# Patient Record
Sex: MALE | Race: WHITE | Employment: PART TIME | URBAN - METROPOLITAN AREA
[De-identification: names, ages, dates, MRNs, and addresses within clinical notes are randomized per-mention and may not be internally consistent; named-entity substitution may affect disease eponyms.]

---

## 2018-07-31 ENCOUNTER — OFFICE VISIT (OUTPATIENT)
Dept: INTERNAL MEDICINE CLINIC | Age: 23
End: 2018-07-31
Payer: COMMERCIAL

## 2018-07-31 VITALS
OXYGEN SATURATION: 98 % | WEIGHT: 126 LBS | HEART RATE: 75 BPM | DIASTOLIC BLOOD PRESSURE: 70 MMHG | HEIGHT: 70 IN | SYSTOLIC BLOOD PRESSURE: 112 MMHG | TEMPERATURE: 98.6 F | BODY MASS INDEX: 18.04 KG/M2

## 2018-07-31 DIAGNOSIS — F41.0 PANIC DISORDER: Primary | ICD-10-CM

## 2018-07-31 DIAGNOSIS — F19.20 DRUG ADDICTION (HCC): ICD-10-CM

## 2018-07-31 PROCEDURE — 99204 OFFICE O/P NEW MOD 45 MIN: CPT | Performed by: NURSE PRACTITIONER

## 2018-07-31 RX ORDER — GABAPENTIN 100 MG/1
CAPSULE ORAL
Refills: 1 | COMMUNITY
Start: 2018-07-13

## 2018-07-31 ASSESSMENT — PATIENT HEALTH QUESTIONNAIRE - PHQ9
SUM OF ALL RESPONSES TO PHQ9 QUESTIONS 1 & 2: 0
2. FEELING DOWN, DEPRESSED OR HOPELESS: 0
SUM OF ALL RESPONSES TO PHQ QUESTIONS 1-9: 0
1. LITTLE INTEREST OR PLEASURE IN DOING THINGS: 0

## 2018-07-31 NOTE — PROGRESS NOTES
[address] [place] [address]    Roby García is a 25 y.o. male who is here with c/o of   Chief Complaint   Patient presents with    Established New Doctor     patient would like to discuss plan of treatment/medication       Patient Accompanied by: patient    RADHA García is here to establish care and to discuss     · He reports that he is weaning off of suboxone for oxycodone dependence. He stopped taking oxycodone approximately 3 years ago. He is currently going to [facility]'s clinic. He reports that he is also seeing a psychiatrist. He reports that he has had good results with clonopin, but due to his suboxone use, he is not able to take this medication. He feels extremely frustrated and states that cognitive therapy is not working. He reports that he has seen at least 4 other primary providers seeking help with his panic feelings and has been told by all that there is nothing for them to offer him as he has tried all of the available medications and is failing therapy. · He reports that he is taking gabapentin for his chronic low back pain. He reports his back pain is constant, achy and does not radiate down his legs. Aggravating factors include sitting and standing for long periods of time. There are no relieving factors. There is no problem list on file for this patient. Past Medical History:   Diagnosis Date    Allergic rhinitis     Anxiety     Asthma     Chronic back pain     Depression     Substance abuse       History reviewed. No pertinent surgical history. Family History   Problem Relation Age of Onset    Allergy (Severe) Mother     Other Mother     Diabetes Father      Current Outpatient Prescriptions   Medication Sig Dispense Refill    gabapentin (NEURONTIN) 100 MG capsule TAKE 2 CAPSULES 3 TIMES A DAY AS NEEDED  1     No current facility-administered medications for this visit.       ALLERGIES:    Allergies   Allergen Reactions    Penicillins Hives    Augmentin addiction [facility])  External Referral To Psychiatry           Plan/Medical Decision Making     Vinnie Sanchez was seen in clinic today for establishing care and to discuss panic attacks and chronic low back pain. · I do not feel that I can help Mr. Stefano Lennon. I have referred him to [facility] for psychiatry. He has repeatedly requested clonopin for which I can not prescribe as he is under care with suboxone. · Back pain - he is to continue gabapentin. Any further pain control issues will need to be addressed through pain management    1. Vinnie Sanchez received counseling on the following healthy behaviors: nutrition, exercise and medication adherence  2. Patient given educational materials - see patient instructions  3. Was a self-tracking handout given in paper form or via [employer]? No  If yes, see orders or list here. 4.  Discussed use, benefit, and side effects of prescribed medications. Barriers to medication compliance addressed. All patient questions answered. Pt voiced understanding. 5.  Reviewed prior labs, imaging, consultation, follow up, and health maintenance  6. Continue current medications, diet and exercise. 7. Discussed use, benefit, and side effects of prescribed medications. Barriers to medication compliance addressed. All her questions were answered. Pt voiced understanding. Vinnie Sanchez will continue current medications, diet and exercise. Return if symptoms worsen or fail to improve. Orders Placed This Encounter   Procedures    External Referral To Psychiatry     Referral Priority:   Urgent     Referral Type:   Consult for Advice and Opinion     Referral Reason:   Specialty Services Required     Referred to Provider:   Mike Rodriguez MD     Requested Specialty:   Psychiatry     Number of Visits Requested:   1       Completed Orders/Prescriptions   No orders of the defined types were placed in this encounter.       Outpatient Encounter Prescriptions as of 7/31/2018   Medication Sig

## 2023-06-24 ENCOUNTER — HOSPITAL ENCOUNTER
Dept: HOSPITAL 101 - LAB | Age: 28
Discharge: HOME | End: 2023-06-24
Payer: COMMERCIAL

## 2023-06-24 DIAGNOSIS — R20.2: ICD-10-CM

## 2023-06-24 DIAGNOSIS — Z13.6: ICD-10-CM

## 2023-06-24 DIAGNOSIS — D64.9: Primary | ICD-10-CM

## 2023-06-24 LAB
ADD MANUAL DIFF: NO
ALANINE AMINOTRANSFERASE: 39 U/L (ref 16–63)
ALBUMIN GLOBULIN RATIO: 1
ALBUMIN LEVEL: 4.1 G/DL (ref 3.4–5)
ALKALINE PHOSPHATASE: 68 U/L (ref 46–116)
ANION GAP: 12.3
ASPARTATE AMINO TRANSFERASE: 18 U/L (ref 15–37)
BLOOD UREA NITROGEN: 7 MG/DL (ref 7–18)
CALCIUM: 9.4 MG/DL (ref 8.5–10.1)
CARBON DIOXIDE: 30.1 MMOL/L (ref 21–32)
CHLORIDE: 103 MMOL/L (ref 98–107)
CHOLESTEROL: 141 MG/DL (ref ?–200)
CO2 BLD-SCNC: 30.1 MMOL/L (ref 21–32)
ESTIMATED GFR (AFRICAN AMERICA: >60 (ref 60–?)
ESTIMATED GFR (NON-AFRICAN AME: >60 (ref 60–?)
FERRITIN: 127 NG/ML (ref 26–388)
FOLATE: 16.8 NG/ML (ref 8.6–58.9)
GLOBULIN: 4 G/DL
GLUCOSE BLD-MCNC: 102 MG/DL (ref 74–106)
HCT VFR BLD CALC: 42.6 % (ref 42–54)
HDL CHOLESTEROL: 47 MG/DL (ref 40–60)
HEMATOCRIT: 42.6 % (ref 42–54)
HEMOGLOBIN: 14.3 G/DL (ref 14–18)
IMMATURE GRANULOCYTES ABS AUTO: 0.01 10^3/UL (ref 0–0.03)
IMMATURE GRANULOCYTES PCT AUTO: 0.2 % (ref 0–0.5)
IRON: 74 UG/DL (ref 65–175)
LYMPHOCYTES  ABSOLUTE AUTO: 3.1 10^3/UL (ref 1.2–3.8)
MAGNESIUM: 2 MG/DL (ref 1.8–2.4)
MCV RBC: 89.3 FL (ref 80–94)
MEAN CORPUSCULAR HEMOGLOBIN: 30 PG (ref 25.9–34)
MEAN CORPUSCULAR HGB CONC: 33.6 G/DL (ref 29.9–35.2)
MEAN CORPUSCULAR VOLUME: 89.3 FL (ref 80–94)
PERCENT IRON SATURATION: 24.7 %
PLATELET # BLD: 207 10^3/UL (ref 150–450)
PLATELET COUNT: 207 10^3/UL (ref 150–450)
POTASSIUM SERPLBLD-SCNC: 4.4 MMOL/L (ref 3.5–5.1)
POTASSIUM: 4.4 MMOL/L (ref 3.5–5.1)
RED BLOOD COUNT: 4.77 10^6/UL (ref 4.7–6.1)
SODIUM BLD-SCNC: 141 MMOL/L (ref 136–145)
SODIUM: 141 MMOL/L (ref 136–145)
THYROID STIMULATING HORMONE: 0.88 UIU/ML (ref 0.36–3.74)
TOTAL IRON BINDING CAPACITY: 299 UG/DL (ref 250–450)
TOTAL PROTEIN: 8.1 G/DL (ref 6.4–8.2)
TRIGLYCERIDES: 51 MG/DL (ref ?–150)
VITAMIN B12: 569 PG/ML (ref 193–986)
VLDL CHOLESTEROL: 10.2 MG/DL
WBC # BLD: 6.3 10^3/UL (ref 4–11)
WHITE BLOOD COUNT: 6.3 10^3/UL (ref 4–11)

## 2023-06-24 PROCEDURE — 83550 IRON BINDING TEST: CPT

## 2023-06-24 PROCEDURE — 83735 ASSAY OF MAGNESIUM: CPT

## 2023-06-24 PROCEDURE — 83540 ASSAY OF IRON: CPT

## 2023-06-24 PROCEDURE — 82728 ASSAY OF FERRITIN: CPT

## 2023-06-24 PROCEDURE — 84443 ASSAY THYROID STIM HORMONE: CPT

## 2023-06-24 PROCEDURE — 36415 COLL VENOUS BLD VENIPUNCTURE: CPT

## 2023-06-24 PROCEDURE — 82746 ASSAY OF FOLIC ACID SERUM: CPT

## 2023-06-24 PROCEDURE — 85025 COMPLETE CBC W/AUTO DIFF WBC: CPT

## 2023-06-24 PROCEDURE — 80053 COMPREHEN METABOLIC PANEL: CPT

## 2023-06-24 PROCEDURE — 82607 VITAMIN B-12: CPT

## 2023-06-24 PROCEDURE — 80061 LIPID PANEL: CPT
